# Patient Record
Sex: MALE | Race: WHITE | NOT HISPANIC OR LATINO | Employment: FULL TIME | URBAN - METROPOLITAN AREA
[De-identification: names, ages, dates, MRNs, and addresses within clinical notes are randomized per-mention and may not be internally consistent; named-entity substitution may affect disease eponyms.]

---
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
## 2022-05-11 ENCOUNTER — OFFICE VISIT (OUTPATIENT)
Dept: URGENT CARE | Facility: URGENT CARE | Age: 22
End: 2022-05-11

## 2022-05-11 VITALS
SYSTOLIC BLOOD PRESSURE: 121 MMHG | HEART RATE: 86 BPM | DIASTOLIC BLOOD PRESSURE: 80 MMHG | OXYGEN SATURATION: 99 % | TEMPERATURE: 98.4 F

## 2022-05-11 DIAGNOSIS — R21 RASH AND NONSPECIFIC SKIN ERUPTION: Primary | ICD-10-CM

## 2022-05-11 PROCEDURE — 99203 OFFICE O/P NEW LOW 30 MIN: CPT | Performed by: PHYSICIAN ASSISTANT

## 2022-05-11 RX ORDER — PREDNISONE 20 MG/1
20 TABLET ORAL DAILY
Qty: 5 TABLET | Refills: 0 | Status: SHIPPED | OUTPATIENT
Start: 2022-05-11 | End: 2022-05-16

## 2022-05-11 RX ORDER — TRIAMCINOLONE ACETONIDE 1 MG/G
CREAM TOPICAL 2 TIMES DAILY
Qty: 30 G | Refills: 0 | Status: SHIPPED | OUTPATIENT
Start: 2022-05-11

## 2022-05-11 RX ORDER — PREDNISONE 20 MG/1
20 TABLET ORAL DAILY
Qty: 5 TABLET | Refills: 0 | Status: SHIPPED | OUTPATIENT
Start: 2022-05-11 | End: 2022-05-11

## 2022-05-11 NOTE — PATIENT INSTRUCTIONS
For the steroid cream, use in small patches, do not use over full body  Prednisone orally may help with the rash   OK to Benadryl or Zyrtec for itching

## 2022-05-11 NOTE — PROGRESS NOTES
Assessment & Plan     1. Rash and nonspecific skin eruption  Suspect that patients rash is related to pityriasis Rosea, although he has small macules around larger patches and complains of moderately severe itchiness, so have concerns that an allergy component may be present.   Will treat with medication as below  Discussed rash course  - triamcinolone (KENALOG) 0.1 % external cream; Apply topically 2 times daily Do not use for more than 2 weeks at a time  Dispense: 30 g; Refill: 0  - predniSONE (DELTASONE) 20 MG tablet; Take 1 tablet (20 mg) by mouth daily for 5 days  Dispense: 5 tablet; Refill: 0        Return in about 3 weeks (around 6/1/2022), or if symptoms worsen or fail to improve.    Diagnosis and treatment plan was reviewed with patient and/or family.   We went over any labs or imaging. Discussed worsening symptoms or little to no relief despite treatment plan to follow-up with PCP or return to clinic.  Patient verbalizes understanding. All questions were addressed and answered.     Linda Ibrahim PA-C  [facility]    CHIEF COMPLAINT:   Chief Complaint   Patient presents with     Urgent Care     Skin rash all over chest back and inner thighs started last week  does use a lot of chemicals for work thinking that might be it.      Peter Macias is a 21 year old male who presents to clinic today for evaluation of rash. Started about a week ago and first noticed it on his neck. Has spread to his whole torso and back. Rash has been itchy.   He works detailing at an [employer] and is concerned that the rash may be related to this.   NO products used on it. No new soaps or detergents.  Denies fever/ chills, throat/ lip swelling, difficulty swallowing or breathing, wheezing etc      Past Medical History:   Diagnosis Date     ADHD (attention deficit hyperactivity disorder)      No past surgical history on file.  Social History     Tobacco Use     Smoking status: Never  Smoker     Smokeless tobacco: Never Used   Substance Use Topics     Alcohol use: Never     Current Outpatient Medications   Medication     predniSONE (DELTASONE) 20 MG tablet     triamcinolone (KENALOG) 0.1 % external cream     Acetaminophen (TYLENOL PO)     Amphetamine-Dextroamphetamine (ADDERALL XR PO)     ibuprofen (ADVIL,MOTRIN) 200 MG tablet     No current facility-administered medications for this visit.     No Known Allergies    10 point ROS of systems were all negative except for pertinent positives noted in my HPI.      Exam:   /80 (BP Location: Right arm, Patient Position: Sitting, Cuff Size: Adult Large)   Pulse 86   Temp 98.4  F (36.9  C)   SpO2 99%   Constitutional: healthy, alert and no distress  Head: Normocephalic, atraumatic.  Eyes: conjunctiva clear, no drainage  ENT:  throat without tonsillar hypertrophy or erythema  Neck: neck is supple, no cervical lymphadenopathy or nuchal rigidity  Cardiovascular: RRR  Respiratory: CTA bilaterally, no rhonchi or rales  Gastrointestinal: soft and nontender  Skin:  Several large patches with collarette.  No central clearing.  Scattered smaller macules with collarette of various sizes on trunk and proximal extremites and sparing face.  Neurologic: Speech clear, gait normal. Moves all extremities.    No results found for any visits on 05/11/22.